# Patient Record
Sex: MALE | Race: WHITE
[De-identification: names, ages, dates, MRNs, and addresses within clinical notes are randomized per-mention and may not be internally consistent; named-entity substitution may affect disease eponyms.]

---

## 2021-12-21 ENCOUNTER — HOSPITAL ENCOUNTER (EMERGENCY)
Dept: HOSPITAL 26 - MED | Age: 26
Discharge: LEFT BEFORE DISCHARGE | End: 2021-12-21
Payer: COMMERCIAL

## 2021-12-21 DIAGNOSIS — Z53.21: Primary | ICD-10-CM

## 2021-12-21 NOTE — NUR
3RD ATTEMPT TO CALL PT BACK TO TRIAGE, NO ANSWER IN LOBBY OR OUTSIDE. ATTEMPTED 
TO CALL PT, NO ANSWER AND UNABLE TO LEAVE VOICEMAIL



PATIENT LEFT WITHOUT BEING SEEN BY DR. GONZALES. NO FURTHER CARE PROVIDED FOR 
PATIENT.